# Patient Record
Sex: MALE | URBAN - METROPOLITAN AREA
[De-identification: names, ages, dates, MRNs, and addresses within clinical notes are randomized per-mention and may not be internally consistent; named-entity substitution may affect disease eponyms.]

---

## 2018-05-11 ENCOUNTER — OFFICE VISIT (OUTPATIENT)
Dept: BEHAVIORAL HEALTH | Facility: CLINIC | Age: 43
End: 2018-05-11
Payer: COMMERCIAL

## 2018-05-11 DIAGNOSIS — F43.23 ADJUSTMENT DISORDER WITH MIXED ANXIETY AND DEPRESSED MOOD: Primary | ICD-10-CM

## 2018-05-11 PROCEDURE — 90791 PSYCH DIAGNOSTIC EVALUATION: CPT | Performed by: SOCIAL WORKER

## 2018-05-11 NOTE — MR AVS SNAPSHOT
MRN:[id number]                      After Visit Summary   5/11/2018    Anmol Heath    MRN: [id number]           Visit Information        Provider Department      5/11/2018 10:30 AM [facility]      Your next 10 appointments already scheduled     May 18, 2018  7:30 AM CDT   Return Visit with Kye Kramer [facility] ([facility])    [address] [place] [address] [id number] [phone]              MyChart Information     [employer] gives you secure access to your electronic health record. If you see a primary care provider, you can also send messages to your care team and make appointments. If you have questions, please call your primary care clinic.  If you do not have a primary care provider, please call [phone] and they will assist you.        Care EveryWhere ID     This is your Care EveryWhere ID. This could be used by other organizations to access your [facility] medical records  [id number]        Equal Access to Services     MARILOU HORTON : Hadii aad ku hadasho Sojuvenalali, waaxda luqadaha, qaybta kaalmada adeefrain, makayla medina. So [facility] [phone].    ATENCIÓN: Si habla español, tiene a duke disposición servicios gratuitos de asistencia lingüística. Llame al [phone].    We comply with applicable federal civil rights laws and [place] laws. We do not discriminate on the basis of race, color, national origin, age, disability, sex, sexual orientation, or gender identity.

## 2018-05-11 NOTE — PROGRESS NOTES
"                                                                                                                                                                        Adult Intake Structured Interview  Standard Diagnostic Assessment      CLIENT'S NAME: Anmol Heath  MRN:   [id number]  :   1975  ACCT. NUMBER: [id number]  DATE OF SERVICE: 18      Identifying Information:  Client is a 42 year old, ,  male. Client was referred for counseling by self. Client is currently employed full time. Client attended the session alone.       Client's Statement of Presenting Concern:  Client reports the reason for seeking therapy at this time as \"worry, .\"  Client stated that his symptoms have resulted in the following functional impairments: work / vocational responsibilities        History of Presenting Concern:  No past diagnosis or treatment for depression or anxiety.  Client notes a pattern of \"never really feeling like I have excelled.\"  Despite having attained a PhD and a DVM client notes \"I don't think I have really accomplished all that much.\"  Client states he was fired from his job last August.  His wife reports that they were restructuring and offered him a job in another department.  While client admits this is true he still feels like he has did not do \"a good enough job\" and that is why they got rid of his position.  Client's wife reports a pattern of behavior for the client which includes \"perfectionism, ignoring the things that he does well, and focusing on the negative.\"  She also notes that he has been more withdrawn from the family lately.  Client reports today that he is concerned about his lack of focus, distractibility, and procrastination.  He met with his pcp Dr. Wade who recommended that he seek therapy.      Update:  Sounds like client would like to be more committed to making some behavior changes.  Would like to do this in both his personal and professional life. " "         Social History:  Client grew up in [place].  His parents never .  He is the 3rd of 4 children.  He characterized his childhood as \"good, my parents were loving and fair, I preferred to spend time alone as a child but I did have friends.\"  He reports that he has a \"good\" relationship with his family of origin and talks to his parents and siblings regularly.  Client has a PhD in animal science and also earned a DVM.  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  Client has been together with his wife Rosenda for 17 years.  They have a three-year-old daughter together.  Rosenda also has a DVM degree and works doing research at the [employer].  Client identified some stable and meaningful social connections. Client reported that he has not been involved with the legal system.   Client did not identify any learning problems. There are no ethnic, cultural or Latter day factors that may be relevant for therapy. Client identified his preferred language to be English. Client reported he does not need the assistance of an  or other support involved in therapy. Modifications will not be used to assist communication in therapy. Client did not serve in the .      Client reports family history includes Cancer in his maternal grandfather and paternal grandfather and Diabetes in his father and paternal grandmother.     Mental Health History:  Client reported no family history of mental health issues.  Client has not been previously diagnosed with a mental health diagnosis.  Client has not received mental health services in the past.  Hospitalizations: None.  Client is not currently receiving any mental health services.        Chemical Health History:  Client reported no family history of chemical health issues. Client has not received chemical dependency treatment in the past. Client is not currently receiving any chemical dependency treatment. Client reports no problems as a result of their drinking / drug " use.        Client Reports:  Client denies using alcohol.  Client denies using tobacco.  Client denies using marijuana.  Client denies using caffeine.  Client denies using street drugs.  Client denies the non-medical use of prescription or over the counter drugs.    CAGE: None of the patient's responses to the CAGE screening were positive / Negative CAGE score   Based on the negative Cage-Aid score and clinical interview there  are not indications of drug or alcohol abuse.    Discussed the general effects of drugs and alcohol on health and well-being. Therapist gave client printed information about the effects of chemical use on his health and well being.      Significant Losses / Trauma / Abuse / Neglect Issues:  There are no indications or report of: significant losses, trauma, abuse or neglect.     Issues of possible neglect are not present.        Medical Issues:  Client has had a physical exam to rule out medical causes for current symptoms. Date of last physical exam was within the past year. Client was encouraged to follow up with PCP if symptoms were to develop. The client has a [facility] Primary Care Provider, who is named Claudia Wade. The client reports not having a psychiatrist. Client reports the following current medical concerns: per Epic. The client denies the presence of chronic or episodic pain. There are not significant nutritional concerns.   Client reports current meds as:   Current Outpatient Prescriptions   Medication Sig     cyclobenzaprine (FLEXERIL) 10 MG tablet Take 0.5-1 tablets by mouth 2 times daily as needed for muscle spasms.     fluticasone (FLONASE) 50 MCG/ACT nasal spray Spray 1-2 sprays into both nostrils daily ++++due for appointment for more refills+++++     naproxen (NAPROSYN) 500 MG tablet Take 1 tablet by mouth 2 times daily (with meals).     No current facility-administered medications for this visit.        Client Allergies:  No Known Allergies  no known allergies to  medications    Medical History:  Past Medical History:   Diagnosis Date     Constipation          Medication Adherence:  N/A - Client does not have prescribed psychiatric medications.    Client was provided recommendation to follow-up with prescribing physician.    Mental Status Assessment:  Appearance:   Appropriate   Eye Contact:   Good   Psychomotor Behavior: Normal   Attitude:   Cooperative   Orientation:   All  Speech   Rate / Production: Normal    Volume:  Normal   Mood:    Anxious  Sad   Affect:    Appropriate   Thought Content:  Clear   Thought Form:  Coherent  Logical   Insight:    Good       Review of Symptoms:  Depression: PHQ-9 score of 3  Melisa:  No symptoms  Psychosis: No symptoms  Anxiety: Worries Nervousness  Panic:  No symptoms  Post Traumatic Stress Disorder: No symptoms  Obsessive Compulsive Disorder: No symptoms  Eating Disorder: No symptoms  Oppositional Defiant Disorder: No symptoms  ADD / ADHD: No symptoms  Conduct Disorder: No symptoms      Safety Assessment:    History of Safety Concerns:   Client denied a history of suicidal ideation.    Client denied a history of suicide attempts.    Client denied a history of homicidal ideation.    Client denied a history of self-injurious ideation and behaviors.    Client denied a history of personal safety concerns.    Client denied a history of assaultive behaviors.        Current Safety Concerns:  Client denies current suicidal ideation.    Client denies current homicidal ideation and behaviors.  Client denies current self-injurious ideation and behaviors.    Client denies current concerns for personal safety.      Client reports there are no firearms in the house.     Plan for Safety and Risk Management:  A safety and risk management plan has not been developed at this time, however client was given the after-hours number / 911 should there be a change in any of these risk factors.    Client's Strengths and Limitations:  Client identified the  following strengths or resources that will help him succeed in counseling: friends / good social support and intelligence. Client identified the following supports: family and friends. Things that may interfere with the client's success in counseling include: -.      Diagnostic Criteria:  A. The development of emotional or behavioral symptoms in response to an identifiable stressor(s) occurring within 3 months of the onset of the stressor(s)  B. These symptoms or behaviors are clinically significant, as evidenced by one or both of the following:  C. The stress-related disturbance does not meet criteria for another disorder & is not not an exacerbation of another mental disorder  D. The symptoms do not represent normal bereavement  E. Once the stressor or its consequences have terminated, the symptoms do not persist for more than an additional 6 months       * Adjustment Disorder with Mixed Anxiety and Depressed Mood: The predominant manifestation is a combination of depression and anxiety      Functional Status:  Client's symptoms have caused and are causing reduced functional status in the following areas: Social / Relational - -      DSM5 Diagnoses: (Sustained by DSM5 Criteria Listed Above)  Diagnoses: Adjustment Disorders  309.28 (F43.23) With mixed anxiety and depressed mood  Psychosocial & Contextual Factors: stress at work  WHODAS 2.0 (12 item)            This questionnaire asks about difficulties due to health conditions. Health conditions  include  disease or illnesses, other health problems that may be short or long lasting,  injuries, mental health or emotional problems, and problems with alcohol or drugs.                     Think back over the past 30 days and answer these questions, thinking about how much  difficulty you had doing the following activities. For each question, please [place] only  one response.    Declines whodas  Attendance Agreement:  Client has signed Attendance  Agreement:Yes        Preliminary Treatment Plan:  The client reports no currently identified Rastafarian, ethnic or cultural issues relevant to therapy.      services are not indicated.     Modifications to assist communication are not indicated.     The concerns identified by the client will be addressed in therapy.     Initial Treatment will focus on: Depressed Mood -   Anxiety - .     As a preliminary treatment goal, client will experience a reduction in depressed mood and will develop more effective coping skills to manage depressive symptoms and will experience a reduction in anxiety and will develop more effective coping skills to manage anxiety symptoms.     The focus of initial interventions will be to increase coping skills and teach CBT skills.     Collaboration with other professionals is not indicated at this time.     Referral to another professional/service is not indicated at this time..     A Release of Information is not needed at this time.     Report to child / adult protection services was NA.     Client will have access to their [facility] record.  Homar Cordova, [facility]  May 11, 2018

## 2018-05-12 ASSESSMENT — PATIENT HEALTH QUESTIONNAIRE - PHQ9: SUM OF ALL RESPONSES TO PHQ QUESTIONS 1-9: 3

## 2018-05-18 ENCOUNTER — OFFICE VISIT (OUTPATIENT)
Dept: BEHAVIORAL HEALTH | Facility: CLINIC | Age: 43
End: 2018-05-18
Payer: COMMERCIAL

## 2018-05-18 DIAGNOSIS — F43.23 ADJUSTMENT DISORDER WITH MIXED ANXIETY AND DEPRESSED MOOD: Primary | ICD-10-CM

## 2018-05-18 PROCEDURE — 90834 PSYTX W PT 45 MINUTES: CPT | Performed by: SOCIAL WORKER

## 2018-05-18 NOTE — PROGRESS NOTES
"                                             Progress Note    Client Name: Anmol Heath  Date: 5/18/2018          Service Type: Individual      Session Start Time: 730  Session End Time: 815      Session Length: 38-52     Session #: 2     Attendees: Client attended alone    Treatment Plan Last Reviewed: 5/18/2018   PHQ-9 / MARISOL-7 :      DATA      Progress Since Last Session (Related to Symptoms / Goals / Homework):   Symptoms: Stable    Homework: Partially completed      Episode of Care Goals: Satisfactory progress - PREPARATION (Decided to change - considering how); Intervened by negotiating a change plan and determining options / strategies for behavior change, identifying triggers, exploring social supports, and working towards setting a date to begin behavior change     Current / Ongoing Stressors and Concerns:     Client notes that he has been \"ok.\"  Wife is away on business and so he has debbie having ot take care of moire around the house.  Long discussion today about behavioral activation.  Reviewed basic tracking options, identified differences between long-term v short-term planning, and discussed setting and achieving SMART goals as an effective method for dealing with depression, anxiety, and many other mental health concerns.  Set behavioral goals in session today.              Treatment Objective(s) Addressed in This Session:   Client will complete 3 BA tasks per week     Intervention:   CBT: -   Discussed cognitive restructuring and behavioral activation.  Explored the connection between thoughts, feelings, and actions by using examples relative to client's presenting concerns.  Explained major domains of symptoms (cognitive, emotional, somatic, behavioral, interpersonal) and importance of targeted and specific interventions to reduce symptoms of anxiety and depression.  Discussed role of symptom monitoring in cognitive behavioral treatment.       ASSESSMENT: Current Emotional / Mental Status " (status of significant symptoms):   Risk status (Self / Other harm or suicidal ideation)   Client denies current fears or concerns for personal safety.   Client denies current or recent suicidal ideation or behaviors.   Client denies current or recent homicidal ideation or behaviors.   Client denies current or recent self injurious behavior or ideation.   Client denies other safety concerns.   A safety and risk management plan has not been developed at this time, however client was given the after-hours number / 911 should there be a change in any of these risk factors.     Appearance:   Appropriate    Eye Contact:   Good    Psychomotor Behavior: Normal    Attitude:   Cooperative    Orientation:   All   Speech    Rate / Production: Normal     Volume:  Normal    Mood:    Anxious    Affect:    Appropriate    Thought Content:  Clear    Thought Form:  Coherent  Logical    Insight:    Good      Medication Review:   No current psychiatric medications prescribed     Medication Compliance:   NA     Changes in Health Issues:   None reported     Chemical Use Review:   Substance Use: Chemical use reviewed, no active concerns identified      Tobacco Use: No current tobacco use.       Collateral Reports Completed:   Not Applicable    PLAN: (Client Tasks / Therapist Tasks / Other)  1.  BA goals on exercise, yoga, work, and closeness  2.  Meet in two weeks        LEYLA Tanner                                                         ________________________________________________________________________    Treatment Plan    Client's Name: Anmol Heath  YOB: 1975    Date: 5/18/2018     DSM5 Diagnoses: (Sustained by DSM5 Criteria Listed Above)  Diagnoses: Adjustment Disorders  309.28 (F43.23) With mixed anxiety and depressed mood  Psychosocial & Contextual Factors: stress at work  WHODAS 2.0 (12 item)            This questionnaire asks about difficulties due to health conditions. Health conditions   include  disease or illnesses, other health problems that may be short or long lasting,  injuries, mental health or emotional problems, and problems with alcohol or drugs.                     Think back over the past 30 days and answer these questions, thinking about how much  difficulty you had doing the following activities. For each question, please [place] only  one response.    Declines whodas    Referral / Collaboration:  Referral to another professional/service is not indicated at this time..    Anticipated number of session or this episode of care: 18-24      MeasurableTreatment Goal(s) related to diagnosis / functional impairment(s)      Goal- Anxiety: Client will decrease anxiety    I will know I've met my goal when I am less anxious.      Objective #A (Client Action)    Status: New - Date: 5/18/2018    Client will use cognitive strategies identified in therapy to challenge anxious thoughts.    Intervention(s)  Therapist will provide psychoeducation, behavioral activation, and cognitive restructuring.    Objective #B  Client will use at least 3 coping skills for anxiety management in the next 12 weeks.    Status: New - Date: 5/18/2018    Intervention(s)  Therapist will provide psychoeducation, behavioral activation, and cognitive restructuring.      Objective #C  Client will identify three distraction and diversion activities and use those activities to decrease level of anxiety.  Status: New - Date: 5/18/2018    Intervention(s)  Therapist will provide psychoeducation, behavioral activation, and cognitive restructuring.                  Client has reviewed and agreed to the above plan.      Homar Cordova, [facility]  May 18, 2018

## 2018-05-18 NOTE — MR AVS SNAPSHOT
MRN:[id number]                      After Visit Summary   5/18/2018    Anmol Heath    MRN: [id number]           Visit Information        Provider Department      5/18/2018 7:30 AM Kye Kramer, [facility] Generic      Your next 10 appointments already scheduled     Jun 01, 2018  7:30 AM CDT   Return Visit with Kye Kramer [facility]    [address] [place] [address] [phone] [phone]              MyChart Information     [employer] gives you secure access to your electronic health record. If you see a primary care provider, you can also send messages to your care team and make appointments. If you have questions, please call your primary care clinic.  If you do not have a primary care provider, please call [phone] and they will assist you.        Care EveryWhere ID     This is your Care EveryWhere ID. This could be used by other organizations to access your [facility] medical records  [id number]        Equal Access to Services     MARILOU HORTON : Hadii aad ku hadasho Sobartolome, waaxda luqadaha, qaybta kaalmada adeefrain, makayla medina. So [facility] [phone].    ATENCIÓN: Si habla español, tiene a duke disposición servicios gratuitos de asistencia lingüística. Llame al [phone].    We comply with applicable federal civil rights laws and [place] laws. We do not discriminate on the basis of race, color, national origin, age, disability, sex, sexual orientation, or gender identity.

## 2018-06-01 ENCOUNTER — OFFICE VISIT (OUTPATIENT)
Dept: BEHAVIORAL HEALTH | Facility: CLINIC | Age: 43
End: 2018-06-01
Payer: COMMERCIAL

## 2018-06-01 DIAGNOSIS — F41.9 ANXIETY: Primary | ICD-10-CM

## 2018-06-01 PROCEDURE — 90834 PSYTX W PT 45 MINUTES: CPT | Performed by: SOCIAL WORKER

## 2018-06-01 NOTE — PROGRESS NOTES
"                                             Progress Note    Client Name: Anmol Heath  Date: 6/1/2018          Service Type: Individual      Session Start Time: 730  Session End Time: 815      Session Length: 38-52     Session #: 3   Attendees: Client attended alone    Treatment Plan Last Reviewed: 5/18/2018   PHQ-9 / MARISOL-7 :      DATA      Progress Since Last Session (Related to Symptoms / Goals / Homework):   Symptoms: Stable    Homework: Partially completed      Episode of Care Goals: Satisfactory progress - PREPARATION (Decided to change - considering how); Intervened by negotiating a change plan and determining options / strategies for behavior change, identifying triggers, exploring social supports, and working towards setting a date to begin behavior change     Current / Ongoing Stressors and Concerns:     Client notes that he has been \"ok.\"  Has made some progress in some of the things that we discussed, only went out for a walk once and we had talked about him doing it three times.  Long discussion today about behavioral activation.  Reviewed basic tracking options, identified differences between long-term v short-term planning, and discussed setting and achieving SMART goals as an effective method for dealing with depression, anxiety, and many other mental health concerns.  Set behavioral goals in session today.                  Treatment Objective(s) Addressed in This Session:   Client will complete 3 BA tasks per week     Intervention:   CBT: -   Discussed cognitive restructuring and behavioral activation.  Explored the connection between thoughts, feelings, and actions by using examples relative to client's presenting concerns.  Explained major domains of symptoms (cognitive, emotional, somatic, behavioral, interpersonal) and importance of targeted and specific interventions to reduce symptoms of anxiety and depression.  Discussed role of symptom monitoring in cognitive behavioral " treatment.       ASSESSMENT: Current Emotional / Mental Status (status of significant symptoms):   Risk status (Self / Other harm or suicidal ideation)   Client denies current fears or concerns for personal safety.   Client denies current or recent suicidal ideation or behaviors.   Client denies current or recent homicidal ideation or behaviors.   Client denies current or recent self injurious behavior or ideation.   Client denies other safety concerns.   A safety and risk management plan has not been developed at this time, however client was given the after-hours number / 911 should there be a change in any of these risk factors.     Appearance:   Appropriate    Eye Contact:   Good    Psychomotor Behavior: Normal    Attitude:   Cooperative    Orientation:   All   Speech    Rate / Production: Normal     Volume:  Normal    Mood:    Anxious    Affect:    Appropriate    Thought Content:  Clear    Thought Form:  Coherent  Logical    Insight:    Good      Medication Review:   No current psychiatric medications prescribed     Medication Compliance:   NA     Changes in Health Issues:   None reported     Chemical Use Review:   Substance Use: Chemical use reviewed, no active concerns identified      Tobacco Use: No current tobacco use.       Collateral Reports Completed:   Not Applicable    PLAN: (Client Tasks / Therapist Tasks / Other)  1.  BA goals on exercise, yoga, work, and closeness  2.  Meet in two weeks        LEYLA Tanner                                                         ________________________________________________________________________    Treatment Plan    Client's Name: Anmol Heath  YOB: 1975    Date: 5/18/2018     DSM5 Diagnoses: (Sustained by DSM5 Criteria Listed Above)  Diagnoses: Adjustment Disorders  309.28 (F43.23) With mixed anxiety and depressed mood  Psychosocial & Contextual Factors: stress at work  WHODAS 2.0 (12 item)            This questionnaire asks  about difficulties due to health conditions. Health conditions  include  disease or illnesses, other health problems that may be short or long lasting,  injuries, mental health or emotional problems, and problems with alcohol or drugs.                     Think back over the past 30 days and answer these questions, thinking about how much  difficulty you had doing the following activities. For each question, please [place] only  one response.    Declines whodas    Referral / Collaboration:  Referral to another professional/service is not indicated at this time..    Anticipated number of session or this episode of care: 18-24      MeasurableTreatment Goal(s) related to diagnosis / functional impairment(s)      Goal- Anxiety: Client will decrease anxiety    I will know I've met my goal when I am less anxious.      Objective #A (Client Action)    Status: New - Date: 5/18/2018    Client will use cognitive strategies identified in therapy to challenge anxious thoughts.    Intervention(s)  Therapist will provide psychoeducation, behavioral activation, and cognitive restructuring.    Objective #B  Client will use at least 3 coping skills for anxiety management in the next 12 weeks.    Status: New - Date: 5/18/2018    Intervention(s)  Therapist will provide psychoeducation, behavioral activation, and cognitive restructuring.      Objective #C  Client will identify three distraction and diversion activities and use those activities to decrease level of anxiety.  Status: New - Date: 5/18/2018    Intervention(s)  Therapist will provide psychoeducation, behavioral activation, and cognitive restructuring.                  Client has reviewed and agreed to the above plan.      Homar Cordova, [facility]  May 18, 2018

## 2018-06-01 NOTE — MR AVS SNAPSHOT
MRN:[id number]                      After Visit Summary   6/1/2018    Anmol Heath    MRN: [id number]           Visit Information        Provider Department      6/1/2018 7:30 AM Kye Kramer, [facility]      Your next 10 appointments already scheduled     Jun 12, 2018  3:15 PM CDT   Return Visit with Kye Kramer [facility] ([facility])    [address] [place] [id number] [phone]              MyChart Information     Useful Systemshart gives you secure access to your electronic health record. If you see a primary care provider, you can also send messages to your care team and make appointments. If you have questions, please call your primary care clinic.  If you do not have a primary care provider, please call [phone] and they will assist you.        Care EveryWhere ID     This is your Care EveryWhere ID. This could be used by other organizations to access your [place] medical records  [id number]        Equal Access to Services     MARILOU HORTON : Hadii josette crofto Sobartolome, waaxda luqadaha, qaybta kaalmada adeefrain, makayla medina. So [facility] [phone].    ATENCIÓN: Si habla español, tiene a duke disposición servicios gratuitos de asistencia lingüística. Llame al [phone].    We comply with applicable federal civil rights laws and [place] laws. We do not discriminate on the basis of race, color, national origin, age, disability, sex, sexual orientation, or gender identity.

## 2018-06-13 ENCOUNTER — OFFICE VISIT (OUTPATIENT)
Dept: BEHAVIORAL HEALTH | Facility: CLINIC | Age: 43
End: 2018-06-13
Payer: COMMERCIAL

## 2018-06-13 DIAGNOSIS — F41.9 ANXIETY: ICD-10-CM

## 2018-06-13 DIAGNOSIS — F43.23 ADJUSTMENT DISORDER WITH MIXED ANXIETY AND DEPRESSED MOOD: Primary | ICD-10-CM

## 2018-06-13 PROCEDURE — 90834 PSYTX W PT 45 MINUTES: CPT | Performed by: SOCIAL WORKER

## 2018-06-13 NOTE — MR AVS SNAPSHOT
MRN:[id number]                      After Visit Summary   6/13/2018    Anmol Heath    MRN: [id number]           Visit Information        Provider Department      6/13/2018 2:30 PM Kye Kramer, [facility]      MyChart Information     MyChart gives you secure access to your electronic health record. If you see a primary care provider, you can also send messages to your care team and make appointments. If you have questions, please call your primary care clinic.  If you do not have a primary care provider, please call [phone] and they will assist you.        Care EveryWhere ID     This is your Care EveryWhere ID. This could be used by other organizations to access your [place] medical records  [id number]        Equal Access to Services     MARILOU HORTON : Tramaine Underwood, wajocelin vazquez, devonte rodriguezalashley lundberg, makayla medina. So [facility] [phone].    ATENCIÓN: Si habla español, tiene a duke disposición servicios gratuitos de asistencia lingüística. Llame al [phone].    We comply with applicable federal civil rights laws and [place] laws. We do not discriminate on the basis of race, color, national origin, age, disability, sex, sexual orientation, or gender identity.

## 2018-06-13 NOTE — PROGRESS NOTES
"                                             Progress Note    Client Name: Anmol Heath  Date: 6/13/2018           Service Type: Individual      Session Start Time: 230  Session End Time: 315      Session Length: 38-52     Session #: 4   Attendees: Client attended alone    Treatment Plan Last Reviewed: 5/18/2018   PHQ-9 / MARISOL-7 :      DATA      Progress Since Last Session (Related to Symptoms / Goals / Homework):   Symptoms: Stable    Homework: Partially completed      Episode of Care Goals: Satisfactory progress - PREPARATION (Decided to change - considering how); Intervened by negotiating a change plan and determining options / strategies for behavior change, identifying triggers, exploring social supports, and working towards setting a date to begin behavior change     Current / Ongoing Stressors and Concerns:     Client notes that he has been \"somewhat similiar to before.\"  Reviewed goals and client's lack of motivation to do it.      Motivational Interviewing  Target Behavior: exercise    Stage of Change: CONTEMPLATION (Considering change and yet undecided)    MI Intervention: Expressed Empathy/Understanding, Supported Autonomy, Collaboration, Evocation and Permission to raise concern or advise     Change Talk Expressed by the Patient: Desire to change Ability to change Reasons to change Need to change    Provider Response to Change Talk: E - Evoked more info from patient about behavior change, A - Affirmed patient's thoughts, decisions, or attempts at behavior change, R - Reflected patient's change talk and S - Summarized patient's change talk statements        Long discussion today about behavioral activation.  Reviewed basic tracking options, identified differences between long-term v short-term planning, and discussed setting and achieving SMART goals as an effective method for dealing with depression, anxiety, and many other mental health concerns.  Set behavioral goals in session today.     "        Treatment Objective(s) Addressed in This Session:   Client will complete 3 BA tasks per week     Intervention:   CBT: -   Discussed cognitive restructuring and behavioral activation.  Explored the connection between thoughts, feelings, and actions by using examples relative to client's presenting concerns.  Explained major domains of symptoms (cognitive, emotional, somatic, behavioral, interpersonal) and importance of targeted and specific interventions to reduce symptoms of anxiety and depression.  Discussed role of symptom monitoring in cognitive behavioral treatment.       ASSESSMENT: Current Emotional / Mental Status (status of significant symptoms):   Risk status (Self / Other harm or suicidal ideation)   Client denies current fears or concerns for personal safety.   Client denies current or recent suicidal ideation or behaviors.   Client denies current or recent homicidal ideation or behaviors.   Client denies current or recent self injurious behavior or ideation.   Client denies other safety concerns.   A safety and risk management plan has not been developed at this time, however client was given the after-hours number / 911 should there be a change in any of these risk factors.     Appearance:   Appropriate    Eye Contact:   Good    Psychomotor Behavior: Normal    Attitude:   Cooperative    Orientation:   All   Speech    Rate / Production: Normal     Volume:  Normal    Mood:    Anxious    Affect:    Appropriate    Thought Content:  Clear    Thought Form:  Coherent  Logical    Insight:    Good      Medication Review:   No current psychiatric medications prescribed     Medication Compliance:   NA     Changes in Health Issues:   None reported     Chemical Use Review:   Substance Use: Chemical use reviewed, no active concerns identified      Tobacco Use: No current tobacco use.       Collateral Reports Completed:   Not Applicable    PLAN: (Client Tasks / Therapist Tasks / Other)  1.  BA goals on  exercise, yoga, work, and closeness  2.  Meet in two weeks        Homar Cordova, LICSW                                                         ________________________________________________________________________    Treatment Plan    Client's Name: Anmol Heath  YOB: 1975    Date: 5/18/2018     DSM5 Diagnoses: (Sustained by DSM5 Criteria Listed Above)  Diagnoses: Adjustment Disorders  309.28 (F43.23) With mixed anxiety and depressed mood  Psychosocial & Contextual Factors: stress at work  WHODAS 2.0 (12 item)            This questionnaire asks about difficulties due to health conditions. Health conditions  include  disease or illnesses, other health problems that may be short or long lasting,  injuries, mental health or emotional problems, and problems with alcohol or drugs.                     Think back over the past 30 days and answer these questions, thinking about how much  difficulty you had doing the following activities. For each question, please [place] only  one response.    Declines whodas    Referral / Collaboration:  Referral to another professional/service is not indicated at this time..    Anticipated number of session or this episode of care: 18-24      MeasurableTreatment Goal(s) related to diagnosis / functional impairment(s)      Goal- Anxiety: Client will decrease anxiety    I will know I've met my goal when I am less anxious.      Objective #A (Client Action)    Status: New - Date: 5/18/2018    Client will use cognitive strategies identified in therapy to challenge anxious thoughts.    Intervention(s)  Therapist will provide psychoeducation, behavioral activation, and cognitive restructuring.    Objective #B  Client will use at least 3 coping skills for anxiety management in the next 12 weeks.    Status: New - Date: 5/18/2018    Intervention(s)  Therapist will provide psychoeducation, behavioral activation, and cognitive restructuring.      Objective #C  Client will  identify three distraction and diversion activities and use those activities to decrease level of anxiety.  Status: New - Date: 5/18/2018    Intervention(s)  Therapist will provide psychoeducation, behavioral activation, and cognitive restructuring.                  Client has reviewed and agreed to the above plan.      Homar Cordova, [facility]  May 18, 2018

## 2020-02-24 ENCOUNTER — HEALTH MAINTENANCE LETTER (OUTPATIENT)
Age: 45
End: 2020-02-24

## 2020-12-13 ENCOUNTER — HEALTH MAINTENANCE LETTER (OUTPATIENT)
Age: 45
End: 2020-12-13

## 2021-04-17 ENCOUNTER — HEALTH MAINTENANCE LETTER (OUTPATIENT)
Age: 46
End: 2021-04-17

## 2021-09-26 ENCOUNTER — HEALTH MAINTENANCE LETTER (OUTPATIENT)
Age: 46
End: 2021-09-26

## 2022-05-08 ENCOUNTER — HEALTH MAINTENANCE LETTER (OUTPATIENT)
Age: 47
End: 2022-05-08

## 2023-04-23 ENCOUNTER — HEALTH MAINTENANCE LETTER (OUTPATIENT)
Age: 48
End: 2023-04-23

## 2023-06-02 ENCOUNTER — HEALTH MAINTENANCE LETTER (OUTPATIENT)
Age: 48
End: 2023-06-02